# Patient Record
(demographics unavailable — no encounter records)

---

## 2024-11-26 NOTE — ASSESSMENT
[FreeTextEntry1] : 27 year old with balanitis. Has been on betamethasone/clotrimazole cream with improvement but not resolution over past 2 months.  We discussed that attention to genital hygiene is the most important approach for most men with balanitis. Retraction of the foreskin with thorough genital cleansing can be both preventive and therapeutic. Twice-daily bathing of the affected area with saline solution encouraged. We also discussed patient has been urinating without retracting foreskin and drying area and how that can contribute. Furthermore patient has been using bar soap on area multipole times a day since this occurred and we discussed this can be irritating as well.    Given has been on cream on and off for 2 months, we discussed holding cream for now and attempting hygiene measures as above. patient to return if no improvement. Discussed at that time can refer to derm/consider circumcision.  With regards to prior scortal US - discussed benign nature epi cyst. Discussed varicoceles.  Also discussed role of varicocele and infertility, and data on repair. Discussed pros and cons of semen analysis. Counseled that there are men who have abnormal semen parameters, yet they have contributed to a prior successful pregnancy through natural conception. Discussed his SA - mild abnormalities but large vol/con - did abstain 14 days prior. Discussed can repeat with optimal time of abstinence and we can see results vs repeat if issues with fertility. patient would like to hold off on repeating at this time.  Plan: - fu prn - aptient to notify me if no improvement in mild balanitis

## 2024-11-26 NOTE — HISTORY OF PRESENT ILLNESS
[FreeTextEntry1] : Name BHUMIKA BOOTH  MRN [id number]   1997  Contact Number: [phone] ----------------------------------------------------------------------------------------------------------------------------------------- Date of First Visit: 24 Referring Provider/PCP: Dr. Carolin Gilbert f: [phone] -----------------------------------------------------------------------------------------------------------------------------------------  CC: testicular cyst, possible balanitis  History of Present Illness: BHUMIKA BOOTH is a 27 year old male who presents for evaluation erythema/dots on penis. Comes and goes. Uncircumcised. Started about 2 months. Prescribed betamethasone/clotrimazole cream daily over this time, and feels like not really working. Not new soap but is cleaning with soap now multiple times a day. Does not pull back foreskin when urinates. No fevers. Did 2 STIs panels and negative. No issues with urination. Not really painful or itchy, just erythema is bothersome. Never had balanitis prior to this. Has not been sexually active over this time. NO DM or immunodeficiency.  Patient also reports history of epididymal cyst. Found when had testicular discomfort. Did US 24 at prior urologist office. Testicles wnl bilaterally. Bilateral simple cysts 3mm each. Bilateral varicocele 44mm L and R.  Subsequent SA - 14 days abstinence vol 5, 59 mil/ml, 295mil total count, 55% motility 1.5 grade progressive motility, 3% morphology  IPSS 0 QOL 0 KEELY 20  PMH: none PSH: none Family History: no  malignancies Social: single, no children, social work, never smoker, no alcohol, no recreational drugs Meds: betamethasone/clotrimazole cream Allergies: NKDA ROS: no fevers, chills, flank pain, dysuria ----------------------------------------------------------------------------------------------------------------------------------------- Labs: 24: HIV non-reactive RPR non-reactive GC/CT neg/neg

## 2024-11-26 NOTE — LETTER BODY
[Dear  ___] : Dear  [unfilled], [Courtesy Letter:] : I had the pleasure of seeing your patient, [unfilled], in my office today. [Please see my note below.] : Please see my note below. [Consult Closing:] : Thank you very much for allowing me to participate in the care of this patient.  If you have any questions, please do not hesitate to contact me. [Sincerely,] : Sincerely, [FreeTextEntry3] : Dede Weston MD Director of Robotic Education [facility]   [email or web address] [phone]
